# Patient Record
Sex: MALE | Race: AMERICAN INDIAN OR ALASKA NATIVE
[De-identification: names, ages, dates, MRNs, and addresses within clinical notes are randomized per-mention and may not be internally consistent; named-entity substitution may affect disease eponyms.]

---

## 2022-08-29 ENCOUNTER — HOSPITAL ENCOUNTER (OUTPATIENT)
Dept: HOSPITAL 5 - OR | Age: 57
Discharge: HOME | End: 2022-08-29
Attending: ORTHOPAEDIC SURGERY
Payer: COMMERCIAL

## 2022-08-29 VITALS — DIASTOLIC BLOOD PRESSURE: 73 MMHG | SYSTOLIC BLOOD PRESSURE: 117 MMHG

## 2022-08-29 DIAGNOSIS — S83.241A: Primary | ICD-10-CM

## 2022-08-29 DIAGNOSIS — Z88.2: ICD-10-CM
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
## 2022-08-29 DIAGNOSIS — Z98.890: ICD-10-CM
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
## 2022-08-29 DIAGNOSIS — D64.9: ICD-10-CM

## 2022-08-29 DIAGNOSIS — Z79.899: ICD-10-CM
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
## 2022-08-29 PROCEDURE — 29881 ARTHRS KNE SRG MNISECTMY M/L: CPT

## 2022-08-29 NOTE — DISCHARGE SUMMARY
Short Stay Discharge Plan


Activity: no restrictions


Weight Bearing Status: Weight Bear as Tolerated


Diet: regular


Wound: change dressing (in 48 hrs)


Durable Medical Equipment Needed Upon Discharge: Crutches


Follow up with: 


TRUPTI PEREZ JR, MD [Primary Care Provider] - 7 Days


JUDE HUBBARD II, MD [Staff Physician] - 14 Days

## 2022-08-29 NOTE — ANESTHESIA CONSULTATION
Anesthesia Consult and Med Hx


Date of service: 08/29/22





- Airway


Anesthetic Teeth Evaluation: Good


ROM Head & Neck: Adequate


Mental/Hyoid Distance: Adequate


Mallampati Class: Class II


Intubation Access Assessment: Probably Good





- Pre-Operative Health Status


ASA Pre-Surgery Classification: ASA1


Proposed Anesthetic Plan: General





- Pulmonary


Hx Smoking: No


Hx Respiratory Symptoms: No


Hx Sleep Apnea: No (ANAIS PRE SCREEN LOW RISK)





- Cardiovascular System


Hx Hypertension: No





- Central Nervous System


CVA: No





- Endocrine


Hx Renal Disease: No


Hx Liver Disease: No


Hx Insulin Dependent Diabetes: No


Hx Non-Insulin Dependent Diabetes: No


Hx Thyroid Disease: No





- Other Systems


Hx Obesity: No





- Additional Comments


Anesthesia Medical History Comments: No hx anesthetic complications.

## 2022-08-29 NOTE — OPERATIVE REPORT
DATE OF SURGERY: 08/29/2022



PREOPERATIVE DIAGNOSIS:  Right knee medial meniscus tear.



POSTOPERATIVE DIAGNOSIS:  Right knee medial meniscus tear.



PROCEDURE PERFORMED:  Right knee partial medial meniscectomy.



SURGEON:  Kedar Mccarthy II, MD.



ASSISTANT:  None.



ANESTHESIA:  General.



COMPLICATIONS:  None.



DRAINS:  None.



SPECIMENS:  None.



TOURNIQUET TIME:  27 minutes.



Examination under anesthesia reveals full range of motion, no instability.



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

of the posterior horn of the medial meniscus, extending into the red-white zone 

with delamination.



INDICATIONS:  The patient is a 56-year-old male who has been having refractory 

pain in the right knee.  Evaluation workup suggested a medial meniscus tear and 

it was recommended the patient undergo surgical management.  Risks, benefits and

limitations of surgery were discussed with the patient including bleeding, 

infection, injury to nerves, blood vessel and need for operation.  The patient 

appeared to understand the risks and consented to undergo surgery.



TECHNIQUE:  In the preoperative holding area, site was marked with surgical 

marking pen and extremity was prepped and draped in a sterile fashion in a 

supine position.  Standard anteromedial and anterolateral portals were placed 

and diagnostic arthroscopy was performed.  The patellofemoral joint appeared to 

be normal.  The lateral compartment was normal.  In the medial compartment, 

there was noted to be a tear of the posterior horn of the medial meniscus, 

extending into the red-white zone.  There was delamination of the tear as well 

as a displaced flap in the extreme posterior horn.  Using an oscillating shaver 

and biter, the meniscus was debrided back to a stable rim.  The ACL and PCL were

noted to be intact as well.  The arthroscope was then removed.  Incisions were 

closed with 3-0 nylon.  A 0.25% Marcaine was injected, 20 mL. The patient was 

awakened and taken to recovery room in a stable condition.



POSTOPERATIVE PLAN:  The patient will be weightbear as tolerated, do physical 

therapy and work on range of motion exercises.  We will plan to see the patient 

back for a followup visit at 2 weeks postop.







DD: 08/29/2022 09:10 AM

DT: 08/29/2022 09:54 AM

TID: [id number] RECEIPT: [id number]

[facility]